# Patient Record
Sex: MALE | Race: WHITE
[De-identification: names, ages, dates, MRNs, and addresses within clinical notes are randomized per-mention and may not be internally consistent; named-entity substitution may affect disease eponyms.]

---

## 2020-01-30 ENCOUNTER — HOSPITAL ENCOUNTER (EMERGENCY)
Dept: HOSPITAL 74 - JER | Age: 25
Discharge: HOME | End: 2020-01-30
Payer: COMMERCIAL

## 2020-01-30 VITALS — HEART RATE: 94 BPM | DIASTOLIC BLOOD PRESSURE: 78 MMHG | TEMPERATURE: 99.8 F | SYSTOLIC BLOOD PRESSURE: 110 MMHG

## 2020-01-30 VITALS — BODY MASS INDEX: 22.8 KG/M2

## 2020-01-30 DIAGNOSIS — J09.X2: Primary | ICD-10-CM

## 2020-01-30 NOTE — PDOC
History of Present Illness





- General


Chief Complaint: Cold Symptoms


Stated Complaint: FEVER/VOMITING


Time Seen by Provider: 01/30/20 08:50


History Source: Patient


Exam Limitations: No Limitations





- History of Present Illness


Initial Comments: 





01/30/20 10:12


24-year-old male presents to ED with complaints of cough, headache, myalgia, 

sore throat, and fever.  Patient also states vomited x2 today. patient denies 

medical history recent travel, recent illness.  Patient has no other complaints


Is this a multiple visit Asthma Patient?: No


Timing/Duration: reports: just prior to arrival


Severity: reports: mild


Possible Cause: Yes: no prior episodes


Modifying Factors: improves with: coughing


Associated Symptoms: reports: cough, fever/chills, sore throat





Past History





- Travel


Traveled outside of the country in the last 30 days: No


Close contact w/someone who was outside of country & ill: No





- Past Medical History


Allergies/Adverse Reactions: 


 Allergies











Allergy/AdvReac Type Severity Reaction Status Date / Time


 


No Known Allergies Allergy   Verified 01/30/20 08:27











Home Medications: 


Ambulatory Orders





Diphenhydra/Phenyleph/Acetamin [Theraflu Expressmax Cold Nt Lq] 245.5 ml PO 

ASDIR 01/30/20 


Oseltamivir Phosphate [Tamiflu -] 75 mg PO BID #10 capsule 01/30/20 











- Psycho Social/Smoking Cessation Hx


Smoking History: Unknown if ever smoked


Patient Lives Alone: No


Lives with/in: parents





**Review of Systems





- Review of Systems


Able to Perform ROS?: No


Is the patient limited English proficient: No


Constitutional: Yes: Fever


HEENTM: No: Symptoms Reported


Respiratory: Yes: Cough


Cardiac (ROS): No: Symptoms Reported


ABD/GI: No: Symptoms Reported


: No: Symptoms Reported


Musculoskeletal: Yes: Joint Pain, Muscle Pain


Integumentary: No: Symptoms Reported


Neurological: Yes: Headache





*Physical Exam





- Vital Signs


 Last Vital Signs











Temp Pulse Resp BP Pulse Ox


 


 99.5 F   100 H  18   124/72   100 


 


 01/30/20 08:27  01/30/20 08:27  01/30/20 08:27  01/30/20 08:27  01/30/20 08:27














- Physical Exam


General Appearance: Yes: Nourished, Appropriately Dressed.  No: Apparent 

Distress


HEENT: positive: Pharynx Normal (dry lips, tongue pink and moist).  negative: 

Pale Conjunctivae


Neck: positive: Supple


Respiratory/Chest: positive: Lungs Clear, Normal Breath Sounds.  negative: 

Respiratory Distress, Accessory Muscle Use


Cardiovascular: positive: Regular Rhythm, Tachycardia.  negative: Murmur


Gastrointestinal/Abdominal: positive: Soft.  negative: Tenderness


Integumentary: positive: Normal Color, Warm, Moist


Neurologic: positive: Motor Strength 5/5 (Ambulatory)





ED Treatment Course





- Medications


Given in the ED: 


ED Medications














Discontinued Medications














Generic Name Dose Route Start Last Admin





  Trade Name Jigar  PRN Reason Stop Dose Admin


 


Ibuprofen  400 mg  01/30/20 08:55  01/30/20 09:09





  Motrin -  PO  01/30/20 08:56  Not Given





  ONCE ONE   





     





     





     





     


 


Ibuprofen  600 mg  01/30/20 09:01  01/30/20 09:09





  Motrin -  PO  01/30/20 09:02  600 mg





  ONCE ONE   Administration





     





     





     





     


 


Ondansetron HCl  4 mg  01/30/20 08:55  01/30/20 09:09





  Zofran Odt -  SL  01/30/20 08:56  4 mg





  ONCE ONE   Administration





     





     





     





     














Medical Decision Making





- Medical Decision Making





01/30/20 10:00


Chief complaint: URI symptoms since yesterday vomited x2 today.


Exam: Patient  with low-grade temp and tachycardia.  Otherwise normal physical 

exam.


Plan: Motrin, Zofran influenza and strep sent


01/30/20 10:16


 Laboratory Tests











  01/30/20 01/30/20





  09:00 09:00


 


Influenza A (Rapid)  Positive A 


 


Influenza B (Rapid)  Negative 


 


Group A Strep Rapid   Negative








Patient will be discharged home with Tamiflu





Discharge





- Discharge Information


Problems reviewed: Yes


Clinical Impression/Diagnosis: 


 Flu





Condition: Improved


Disposition: HOME





- Admission


No





- Additional Discharge Information


Prescriptions: 


Oseltamivir Phosphate [Tamiflu -] 75 mg PO BID #10 capsule





- Follow up/Referral





- Patient Discharge Instructions


Patient Printed Discharge Instructions:  DI for Influenza -- Adult


Additional Instructions: 


Please drink plenty of fluids 


take Motrin 400 to 600 mg every 6-8 hours.


Take Tamiflu as prescribed.


Rest cover your mouth and wash hands frequently





- Post Discharge Activity


Work/Back to School Note:  Back to Work